# Patient Record
Sex: MALE | Race: WHITE | NOT HISPANIC OR LATINO | URBAN - NONMETROPOLITAN AREA
[De-identification: names, ages, dates, MRNs, and addresses within clinical notes are randomized per-mention and may not be internally consistent; named-entity substitution may affect disease eponyms.]

---

## 2019-03-07 ENCOUNTER — IMPORTED ENCOUNTER (OUTPATIENT)
Dept: URBAN - NONMETROPOLITAN AREA CLINIC 1 | Facility: CLINIC | Age: 54
End: 2019-03-07

## 2019-03-07 PROBLEM — G45.9: Noted: 2019-03-07

## 2019-03-07 PROCEDURE — 99203 OFFICE O/P NEW LOW 30 MIN: CPT

## 2019-03-07 NOTE — PATIENT DISCUSSION
Hx of 2 strokes per pt. Orderd VF and full neuro work up and carotid artery checked. Pt had a TIA in the OS Will see [facility] for full evaluation and discussed circulation  BP  cholesterol and stress management. Pt just moved to [place] from [place] and does not have a PCP. As soon as pt has a Dr he will let us know so we can send a letter.  RTC n/a VF  2 mo F/U

## 2019-03-14 ENCOUNTER — IMPORTED ENCOUNTER (OUTPATIENT)
Dept: URBAN - NONMETROPOLITAN AREA CLINIC 1 | Facility: CLINIC | Age: 54
End: 2019-03-14

## 2019-03-14 PROCEDURE — 92083 EXTENDED VISUAL FIELD XM: CPT

## 2019-03-14 NOTE — PATIENT DISCUSSION
VF 24-2 done today 3/14/19 -IZASOU: WNL Hx of 2 strokes per pt. Orderd VF and full neuro work up and carotid artery checked. Pt had a TIA in the OS Will see [facility] for full evaluation and discussed circulation  BP  cholesterol and stress management. Pt just moved to [place] from [place] and does not have a PCP. As soon as pt has a Dr he will let us know so we can send a letter.  RTC n/a VF  2 mo F/U; Dr's Notes: HVF 3/14/19

## 2019-05-15 ENCOUNTER — IMPORTED ENCOUNTER (OUTPATIENT)
Dept: URBAN - NONMETROPOLITAN AREA CLINIC 1 | Facility: CLINIC | Age: 54
End: 2019-05-15

## 2019-05-15 PROCEDURE — 92012 INTRM OPH EXAM EST PATIENT: CPT

## 2019-05-15 NOTE — PATIENT DISCUSSION
Hx of 2 strokes per pt. Orderd VF and full neuro work up and carotid artery checked. Pt had a TIA in the OS Patient was seen by [facility] for full evaluation and discussed circulation  BP  cholesterol and stress management. -VF done 3/14/19. RTC 6 month DFE; 's Notes: HVF 3/14/19

## 2019-08-15 ENCOUNTER — IMPORTED ENCOUNTER (OUTPATIENT)
Dept: URBAN - NONMETROPOLITAN AREA CLINIC 1 | Facility: CLINIC | Age: 54
End: 2019-08-15

## 2019-08-15 PROBLEM — S05.01XA: Noted: 2019-08-15

## 2019-08-15 PROBLEM — T15.11XA: Noted: 2019-08-15

## 2019-08-15 PROBLEM — G45.9: Noted: 2019-08-15

## 2019-08-15 PROCEDURE — 65210 REMOVE FOREIGN BODY FROM EYE: CPT

## 2019-08-15 NOTE — PATIENT DISCUSSION
Conjunctival Foreign Body w/FB tracking OD-  discussed findings w/patient-  start Tobradex QID OD x 4 days then d/c Rx sent-  monitor as scheduled; 's Notes: HVF 3/14/19Hx of 2 hale per pt

## 2020-01-09 ENCOUNTER — IMPORTED ENCOUNTER (OUTPATIENT)
Dept: URBAN - NONMETROPOLITAN AREA CLINIC 1 | Facility: CLINIC | Age: 55
End: 2020-01-09

## 2020-01-09 PROBLEM — H52.223: Noted: 2020-01-09

## 2020-01-09 PROBLEM — H52.4: Noted: 2020-01-09

## 2020-01-09 PROCEDURE — 92015 DETERMINE REFRACTIVE STATE: CPT

## 2020-01-09 PROCEDURE — 92014 COMPRE OPH EXAM EST PT 1/>: CPT

## 2020-01-09 NOTE — PATIENT DISCUSSION
Simple Astigmatism OU w/Presbyopia-  discussed findings w/patient-  new spectacle Rx issued-  continue to monitor yearly or prnH/o TIA-  discussed findings w/patient-  no episodes at this time-  retinae are clear-  patient to continue to monitor as per MD-  continue to monitor yearly or prn; 's Notes: HVF 3/14/19Hx of 2 hale per pt

## 2022-04-10 ASSESSMENT — TONOMETRY
OS_IOP_MMHG: 15
OS_IOP_MMHG: 16
OS_IOP_MMHG: 15
OD_IOP_MMHG: 15
OD_IOP_MMHG: 16
OS_IOP_MMHG: 15
OD_IOP_MMHG: 15
OD_IOP_MMHG: 15

## 2022-04-10 ASSESSMENT — VISUAL ACUITY
OD_CC: 20/30-1
OS_CC: 20/30
OD_CC: 20/25
OU_SC: 20/25
OS_CC: 20/30
OS_CC: 20/30
OD_CC: 20/25
OD_CC: 20/25
OS_CC: 20/30